# Patient Record
Sex: MALE | Race: WHITE
[De-identification: names, ages, dates, MRNs, and addresses within clinical notes are randomized per-mention and may not be internally consistent; named-entity substitution may affect disease eponyms.]

---

## 2022-08-05 PROBLEM — Z00.00 ENCOUNTER FOR PREVENTIVE HEALTH EXAMINATION: Status: ACTIVE | Noted: 2022-08-05

## 2023-03-02 ENCOUNTER — APPOINTMENT (OUTPATIENT)
Dept: NEUROLOGY | Facility: CLINIC | Age: 58
End: 2023-03-02
Payer: COMMERCIAL

## 2023-03-02 VITALS
BODY MASS INDEX: 29.8 KG/M2 | WEIGHT: 220 LBS | HEIGHT: 72 IN | DIASTOLIC BLOOD PRESSURE: 67 MMHG | SYSTOLIC BLOOD PRESSURE: 124 MMHG

## 2023-03-02 VITALS
DIASTOLIC BLOOD PRESSURE: 89 MMHG | HEIGHT: 69 IN | WEIGHT: 185 LBS | HEART RATE: 80 BPM | BODY MASS INDEX: 27.4 KG/M2 | SYSTOLIC BLOOD PRESSURE: 158 MMHG

## 2023-03-02 DIAGNOSIS — F95.2 TOURETTE'S DISORDER: ICD-10-CM

## 2023-03-02 PROCEDURE — 99213 OFFICE O/P EST LOW 20 MIN: CPT

## 2023-03-02 RX ORDER — GUANFACINE 2 MG/1
2 TABLET ORAL TWICE DAILY
Qty: 180 | Refills: 1 | Status: ACTIVE | COMMUNITY
Start: 2023-03-02 | End: 1900-01-01

## 2023-03-02 RX ORDER — FLUOXETINE HYDROCHLORIDE 10 MG/1
10 CAPSULE ORAL DAILY
Qty: 180 | Refills: 1 | Status: ACTIVE | COMMUNITY
Start: 2023-03-02 | End: 1900-01-01

## 2023-03-02 NOTE — HISTORY OF PRESENT ILLNESS
[FreeTextEntry1] : Mr. NOMAN PHAN returns to the office for follow-up and his prior history and physical have been reviewed and he reports no change since last visit.  he has been seeing us for tourret syndrome and has been on fluoxetin and guanfacine for good result.  no new complaints today.  he is moving to [place] for good.\par \par he also has sleep apnea and has been on cpap therapy.

## 2023-03-02 NOTE — ASSESSMENT
[FreeTextEntry1] :  Tourette Syndrome \par -continue with medication\par \par sleep apnea\par -continue with CPAP therapy\par \par \par \par I, Anamaria Crouch, Attest that this documentation has been prepared under the direction and in the presence of Provider Germain Gamez DO\par \par Thank You for letting me assist in the management of this patient. \par \par Germain Gamez DO\yari Board Certified, Neurology\par